# Patient Record
Sex: MALE | Race: WHITE | NOT HISPANIC OR LATINO
[De-identification: names, ages, dates, MRNs, and addresses within clinical notes are randomized per-mention and may not be internally consistent; named-entity substitution may affect disease eponyms.]

---

## 2020-05-07 ENCOUNTER — TRANSCRIPTION ENCOUNTER (OUTPATIENT)
Age: 65
End: 2020-05-07

## 2020-06-17 ENCOUNTER — TRANSCRIPTION ENCOUNTER (OUTPATIENT)
Age: 65
End: 2020-06-17

## 2020-09-07 ENCOUNTER — TRANSCRIPTION ENCOUNTER (OUTPATIENT)
Age: 65
End: 2020-09-07

## 2020-11-01 ENCOUNTER — TRANSCRIPTION ENCOUNTER (OUTPATIENT)
Age: 65
End: 2020-11-01

## 2021-08-19 ENCOUNTER — TRANSCRIPTION ENCOUNTER (OUTPATIENT)
Age: 66
End: 2021-08-19

## 2022-06-15 ENCOUNTER — NON-APPOINTMENT (OUTPATIENT)
Age: 67
End: 2022-06-15

## 2022-08-30 ENCOUNTER — APPOINTMENT (OUTPATIENT)
Dept: ORTHOPEDIC SURGERY | Facility: CLINIC | Age: 67
End: 2022-08-30

## 2022-08-30 VITALS — HEIGHT: 71 IN | WEIGHT: 198 LBS | BODY MASS INDEX: 27.72 KG/M2

## 2022-08-30 DIAGNOSIS — Z78.9 OTHER SPECIFIED HEALTH STATUS: ICD-10-CM

## 2022-08-30 DIAGNOSIS — Q74.0 OTHER CONGENITAL MALFORMATIONS OF UPPER LIMB(S), INCLUDING SHOULDER GIRDLE: ICD-10-CM

## 2022-08-30 PROCEDURE — 99203 OFFICE O/P NEW LOW 30 MIN: CPT | Mod: 25

## 2022-08-30 PROCEDURE — 20550 NJX 1 TENDON SHEATH/LIGAMENT: CPT

## 2022-08-30 NOTE — HISTORY OF PRESENT ILLNESS
[Gradual] : gradual [7] : 7 [6] : 6 [Dull/Aching] : dull/aching [Localized] : localized [Sharp] : sharp [Nothing helps with pain getting better] : Nothing helps with pain getting better [Full time] : Work status: full time [de-identified] :  67 year year old male, presents today for right hand trigger fingers \par He has had pain and locking on and off for 15 years in RIGHT hand.  Had RIGHT ring trigger finger release surgery.  Has has injections in the past.   Each of his 4 painful fingers have had one injection in the past.    Last inection was over one year ago.  NO recent trauma [] : no

## 2022-08-30 NOTE — PHYSICAL EXAM
[Right] : right hand [1st] : 1st [2nd] : 2nd [3rd] : 3rd [5th] : 5th [] : no swelling [FreeTextEntry3] : RIGHT 1,2,3,5 A1 pulley tenderness

## 2022-08-30 NOTE — REASON FOR VISIT
[FreeTextEntry2] : 08/30/2022 :IVY ITALO , a 67 year year old male, presents today for right hand trigger fingers \par

## 2022-08-30 NOTE — PROCEDURE
[Tendon Sheath] : tendon sheath [Other: ____] : [unfilled] [1st] : 1st trigger finger [___ cc    1%] : Lidocaine ~Vcc of 1%  [___ cc    10mg] : Triamcinolone (Kenalog) ~Vcc of 10 mg

## 2022-09-23 ENCOUNTER — OFFICE (OUTPATIENT)
Dept: URBAN - METROPOLITAN AREA CLINIC 32 | Facility: CLINIC | Age: 67
Setting detail: OPHTHALMOLOGY
End: 2022-09-23
Payer: MEDICARE

## 2022-09-23 DIAGNOSIS — H35.40: ICD-10-CM

## 2022-09-23 DIAGNOSIS — H35.371: ICD-10-CM

## 2022-09-23 DIAGNOSIS — H25.11: ICD-10-CM

## 2022-09-23 PROCEDURE — 92134 CPTRZ OPH DX IMG PST SGM RTA: CPT | Performed by: OPHTHALMOLOGY

## 2022-09-23 PROCEDURE — 92201 OPSCPY EXTND RTA DRAW UNI/BI: CPT | Performed by: OPHTHALMOLOGY

## 2022-09-23 PROCEDURE — 92004 COMPRE OPH EXAM NEW PT 1/>: CPT | Performed by: OPHTHALMOLOGY

## 2022-09-23 ASSESSMENT — CONFRONTATIONAL VISUAL FIELD TEST (CVF)
OD_FINDINGS: FULL
OS_FINDINGS: FULL

## 2022-09-23 ASSESSMENT — VISUAL ACUITY
OD_BCVA: 20/100
OS_BCVA: 20/60

## 2022-10-19 ENCOUNTER — OFFICE (OUTPATIENT)
Dept: URBAN - METROPOLITAN AREA CLINIC 93 | Facility: CLINIC | Age: 67
Setting detail: OPHTHALMOLOGY
End: 2022-10-19
Payer: MEDICARE

## 2022-10-19 DIAGNOSIS — H35.371: ICD-10-CM

## 2022-10-19 PROCEDURE — 99024 POSTOP FOLLOW-UP VISIT: CPT | Performed by: OPHTHALMOLOGY

## 2022-10-19 ASSESSMENT — CONFRONTATIONAL VISUAL FIELD TEST (CVF)
OS_FINDINGS: FULL
OD_FINDINGS: FULL

## 2022-10-19 ASSESSMENT — VISUAL ACUITY
OD_BCVA: 20/25
OS_BCVA: 20/60

## 2022-10-24 ENCOUNTER — OFFICE (OUTPATIENT)
Dept: URBAN - METROPOLITAN AREA CLINIC 32 | Facility: CLINIC | Age: 67
Setting detail: OPHTHALMOLOGY
End: 2022-10-24
Payer: MEDICARE

## 2022-10-24 ENCOUNTER — RX ONLY (RX ONLY)
Age: 67
End: 2022-10-24

## 2022-10-24 DIAGNOSIS — H25.13: ICD-10-CM

## 2022-10-24 DIAGNOSIS — H35.371: ICD-10-CM

## 2022-10-24 PROBLEM — H35.40 PERIPAPILLARY ATROPHY ; RIGHT EYE: Status: ACTIVE | Noted: 2022-09-23

## 2022-10-24 PROCEDURE — 99024 POSTOP FOLLOW-UP VISIT: CPT | Performed by: OPHTHALMOLOGY

## 2022-10-24 ASSESSMENT — VISUAL ACUITY
OS_BCVA: 20/60
OD_BCVA: 20/20

## 2022-10-24 ASSESSMENT — CONFRONTATIONAL VISUAL FIELD TEST (CVF)
OS_FINDINGS: FULL
OD_FINDINGS: FULL

## 2023-05-08 ENCOUNTER — OFFICE (OUTPATIENT)
Dept: URBAN - METROPOLITAN AREA CLINIC 32 | Facility: CLINIC | Age: 68
Setting detail: OPHTHALMOLOGY
End: 2023-05-08
Payer: MEDICARE

## 2023-05-08 ENCOUNTER — RX ONLY (RX ONLY)
Age: 68
End: 2023-05-08

## 2023-05-08 DIAGNOSIS — H25.13: ICD-10-CM

## 2023-05-08 DIAGNOSIS — H35.371: ICD-10-CM

## 2023-05-08 DIAGNOSIS — H43.812: ICD-10-CM

## 2023-05-08 PROCEDURE — 92012 INTRM OPH EXAM EST PATIENT: CPT | Performed by: OPHTHALMOLOGY

## 2023-05-08 PROCEDURE — 92201 OPSCPY EXTND RTA DRAW UNI/BI: CPT | Performed by: OPHTHALMOLOGY

## 2023-05-08 PROCEDURE — 92134 CPTRZ OPH DX IMG PST SGM RTA: CPT | Performed by: OPHTHALMOLOGY

## 2023-05-08 ASSESSMENT — VISUAL ACUITY
OS_BCVA: 20/40+
OD_BCVA: 20/40+

## 2023-05-08 ASSESSMENT — CONFRONTATIONAL VISUAL FIELD TEST (CVF)
OS_FINDINGS: FULL
OD_FINDINGS: FULL

## 2023-07-05 ENCOUNTER — OFFICE (OUTPATIENT)
Dept: URBAN - METROPOLITAN AREA CLINIC 70 | Facility: CLINIC | Age: 68
Setting detail: OPHTHALMOLOGY
End: 2023-07-05
Payer: MEDICARE

## 2023-07-05 DIAGNOSIS — H25.13: ICD-10-CM

## 2023-07-05 DIAGNOSIS — H25.11: ICD-10-CM

## 2023-07-05 DIAGNOSIS — H35.371: ICD-10-CM

## 2023-07-05 PROCEDURE — 92014 COMPRE OPH EXAM EST PT 1/>: CPT | Performed by: OPHTHALMOLOGY

## 2023-07-05 PROCEDURE — 92136 OPHTHALMIC BIOMETRY: CPT | Performed by: OPHTHALMOLOGY

## 2023-07-05 PROCEDURE — 92134 CPTRZ OPH DX IMG PST SGM RTA: CPT | Performed by: OPHTHALMOLOGY

## 2023-07-05 ASSESSMENT — KERATOMETRY
OD_AXISANGLE_DEGREES: 058
OS_K1K2_AVERAGE: 44.5
OD_AXISANGLE_DEGREES: 058
OS_AXISANGLE_DEGREES: 055
OD_K1POWER_DIOPTERS: 42.50
OD_K2POWER_DIOPTERS: 43.75
OD_K2POWER_DIOPTERS: 43.75
OS_K2POWER_DIOPTERS: 44.75
OD_AXISANGLE2_DEGREES: 058
OS_AXISANGLE_DEGREES: 055
OS_CYLAXISANGLE_DEGREES: 55
OD_CYLPOWER_DEGREES: 1.25
OS_K1POWER_DIOPTERS: 44.25
OD_K1K2_AVERAGE: 43.125
OS_K2POWER_DIOPTERS: 44.75
OS_AXISANGLE2_DEGREES: 055
OD_CYLAXISANGLE_DEGREES: 58
OS_K1POWER_DIOPTERS: 44.25
OD_K1POWER_DIOPTERS: 42.50
OS_CYLPOWER_DEGREES: 0.5

## 2023-07-05 ASSESSMENT — SPHEQUIV_DERIVED
OS_SPHEQUIV: -0.5
OS_SPHEQUIV: -0.5
OD_SPHEQUIV: -3.625

## 2023-07-05 ASSESSMENT — REFRACTION_CURRENTRX
OS_OVR_VA: 20/
OD_SPHERE: +1.75
OS_SPHERE: +1.75
OD_OVR_VA: 20/

## 2023-07-05 ASSESSMENT — VISUAL ACUITY
OD_BCVA: 20/30
OS_BCVA: 20/200

## 2023-07-05 ASSESSMENT — REFRACTION_AUTOREFRACTION
OD_CYLINDER: -0.25
OS_CYLINDER: -0.50
OD_AXIS: 171
OD_SPHERE: -3.50
OS_AXIS: 107
OS_SPHERE: -0.25

## 2023-07-05 ASSESSMENT — REFRACTION_MANIFEST
OS_SPHERE: -0.25
OS_AXIS: 110
OS_CYLINDER: -0.50
OD_SPHERE: UNABLE

## 2023-07-05 ASSESSMENT — CONFRONTATIONAL VISUAL FIELD TEST (CVF)
OS_FINDINGS: FULL
OD_FINDINGS: FULL

## 2023-07-05 ASSESSMENT — AXIALLENGTH_DERIVED
OS_AL: 23.4207
OD_AL: 25.251
OS_AL: 23.4207

## 2023-07-11 ENCOUNTER — APPOINTMENT (OUTPATIENT)
Dept: ORTHOPEDIC SURGERY | Facility: CLINIC | Age: 68
End: 2023-07-11
Payer: MEDICARE

## 2023-07-11 VITALS — WEIGHT: 211 LBS | HEIGHT: 71 IN | BODY MASS INDEX: 29.54 KG/M2

## 2023-07-11 DIAGNOSIS — M65.332 TRIGGER FINGER, LEFT MIDDLE FINGER: ICD-10-CM

## 2023-07-11 DIAGNOSIS — M65.341 TRIGGER FINGER, RIGHT RING FINGER: ICD-10-CM

## 2023-07-11 DIAGNOSIS — M65.321 TRIGGER FINGER, RIGHT INDEX FINGER: ICD-10-CM

## 2023-07-11 PROCEDURE — 99213 OFFICE O/P EST LOW 20 MIN: CPT | Mod: 25

## 2023-07-11 PROCEDURE — 20550 NJX 1 TENDON SHEATH/LIGAMENT: CPT | Mod: 50

## 2023-07-11 NOTE — HISTORY OF PRESENT ILLNESS
[Gradual] : gradual [7] : 7 [6] : 6 [Localized] : localized [Sharp] : sharp [Full time] : Work status: full time [de-identified] : 68 year old male presenting with RT index and ring and LT middle finger pain and locking. \par \par He reports he had a TFR for b/l ring fingers. \par \par RT index and ring injected both injected 1 times since the surgery. LT middle finger injeted once. \par  [] : Post Surgical Visit: no [FreeTextEntry1] : B/L Hands  [FreeTextEntry3] : N/A Chronic [FreeTextEntry5] : Pt is a 69 y/o LHD M c/o  many years of biklateral multi digit pain due to Hx of ATF .Prior TX of CSI  on 8/30/22 with good relief. Pt Req CSI today   [de-identified] : CSI 8/30/22 [de-identified] : [facility]

## 2023-07-11 NOTE — PHYSICAL EXAM
[Right] : right hand [2nd] : 2nd [4th] : 4th [Left] : left hand [A1-Pulley] : A1-pulley [] : positive triggering [3rd] : 3rd [FreeTextEntry3] : pretendinous to the middle finger

## 2023-07-11 NOTE — DISCUSSION/SUMMARY
[de-identified] : Discussed the nature of the diagnosis and risk and benefits of different modalities of treatment.\par Discussed conservative management vs CSI. \par Pt would like a CSI in all 3 fingers. \par Done today and tolerated well.\par All questions answered.\par

## 2023-07-11 NOTE — PROCEDURE
[Tendon Sheath] : tendon sheath [Bilateral] : bilaterally of the [Pain] : pain [Inflammation] : inflammation [Alcohol] : alcohol [Ethyl Chloride sprayed topically] : ethyl chloride sprayed topically [Sterile technique used] : sterile technique used [___ cc    1%] : Lidocaine ~Vcc of 1%  [___ cc    10mg] : Triamcinolone (Kenalog) ~Vcc of 10 mg  [] : Patient tolerated procedure well [Risks, benefits, alternatives discussed / Verbal consent obtained] : the risks benefits, and alternatives have been discussed, and verbal consent was obtained [de-identified] : RT Index and ring and LT middle

## 2023-07-17 ENCOUNTER — OFFICE (OUTPATIENT)
Dept: URBAN - METROPOLITAN AREA CLINIC 104 | Facility: CLINIC | Age: 68
Setting detail: OPHTHALMOLOGY
End: 2023-07-17
Payer: MEDICARE

## 2023-07-17 DIAGNOSIS — H25.12: ICD-10-CM

## 2023-07-17 DIAGNOSIS — H25.11: ICD-10-CM

## 2023-07-17 DIAGNOSIS — H25.13: ICD-10-CM

## 2023-07-17 PROCEDURE — 401 NO CHARGE VISIT: Performed by: OPHTHALMOLOGY

## 2023-07-17 ASSESSMENT — SPHEQUIV_DERIVED
OS_SPHEQUIV: -0.5
OD_SPHEQUIV: -3.625
OS_SPHEQUIV: -0.5

## 2023-07-17 ASSESSMENT — KERATOMETRY
OS_AXISANGLE_DEGREES: 055
OS_K1POWER_DIOPTERS: 44.25
OD_AXISANGLE_DEGREES: 058
OD_K1POWER_DIOPTERS: 42.50
OS_K2POWER_DIOPTERS: 44.75
OD_K2POWER_DIOPTERS: 43.75

## 2023-07-17 ASSESSMENT — REFRACTION_MANIFEST
OS_SPHERE: -0.25
OD_SPHERE: UNABLE
OS_AXIS: 110
OS_CYLINDER: -0.50

## 2023-07-17 ASSESSMENT — REFRACTION_AUTOREFRACTION
OS_AXIS: 107
OD_SPHERE: -3.50
OD_AXIS: 171
OS_CYLINDER: -0.50
OS_SPHERE: -0.25
OD_CYLINDER: -0.25

## 2023-07-17 ASSESSMENT — AXIALLENGTH_DERIVED
OS_AL: 23.4207
OS_AL: 23.4207
OD_AL: 25.251

## 2023-07-17 ASSESSMENT — REFRACTION_CURRENTRX
OD_OVR_VA: 20/
OS_OVR_VA: 20/
OS_SPHERE: +1.75
OD_SPHERE: +1.75

## 2023-07-17 ASSESSMENT — VISUAL ACUITY
OD_BCVA: 20/30
OS_BCVA: 20/200

## 2023-08-08 ENCOUNTER — APPOINTMENT (OUTPATIENT)
Dept: ORTHOPEDIC SURGERY | Facility: CLINIC | Age: 68
End: 2023-08-08
Payer: MEDICARE

## 2023-08-08 VITALS — HEIGHT: 71 IN | BODY MASS INDEX: 29.54 KG/M2 | WEIGHT: 211 LBS

## 2023-08-08 PROCEDURE — 20550 NJX 1 TENDON SHEATH/LIGAMENT: CPT | Mod: RT

## 2023-08-08 PROCEDURE — 99213 OFFICE O/P EST LOW 20 MIN: CPT | Mod: 25

## 2023-08-08 NOTE — PROCEDURE
[Large Joint Injection] : Large joint injection [Right] : of the right [5th] : 5th trigger finger [Pain] : pain [Inflammation] : inflammation [Alcohol] : alcohol [Betadine] : betadine [Ethyl Chloride sprayed topically] : ethyl chloride sprayed topically [Sterile technique used] : sterile technique used [___ cc    1%] : Lidocaine ~Vcc of 1%  [___ cc    10mg] : Triamcinolone (Kenalog) ~Vcc of 10 mg  [] : Patient tolerated procedure well [Call if redness, pain or fever occur] : call if redness, pain or fever occur [Apply ice for 15min out of every hour for the next 12-24 hours as tolerated] : apply ice for 15 minutes out of every hour for the next 12-24 hours as tolerated [Patient was advised to rest the joint(s) for ____ days] : patient was advised to rest the joint(s) for [unfilled] days [Patient had decreased mobility in the joint] : patient had decreased mobility in the joint [Risks, benefits, alternatives discussed / Verbal consent obtained] : the risks benefits, and alternatives have been discussed, and verbal consent was obtained

## 2023-08-08 NOTE — HISTORY OF PRESENT ILLNESS
[Gradual] : gradual [7] : 7 [6] : 6 [Localized] : localized [Sharp] : sharp [Full time] : Work status: full time [de-identified] : The injections were helpful for the right ring and middle finger, and for the left middle finger. c/o of right little finger pain  [] : Post Surgical Visit: no [FreeTextEntry1] : B/L Hands  [FreeTextEntry3] : N/A Chronic [FreeTextEntry5] : Pt is a 69 y/o LHD M c/o  many years of biklateral multi digit pain due to Hx of ATF .Prior TX of CSI  on 8/30/22 with good relief. Pt Req CSI today   [de-identified] : CSI 8/30/22 [de-identified] : [facility]

## 2023-08-08 NOTE — ASSESSMENT
[FreeTextEntry1] : Discussed the nature of the diagnosis and risk and benefits of different modalities of treatment. Saw good relief with injection for the other trigger fingers.  Offered and elected for right little tendon sheath injection.  The risks, benefits, alternatives, and contents of the injection have been discussed.  Risks include but are not limited to allergic reaction, flare reaction, bruising, injection site pain, numbness, increased blood sugar, permanent white skin discoloration at the injection site and infection.  The patient understands the risks and agrees to having the injection.  All questions have been answered. Patient agrees to injection.

## 2023-08-16 ENCOUNTER — ASC (OUTPATIENT)
Dept: URBAN - METROPOLITAN AREA SURGERY 8 | Facility: SURGERY | Age: 68
Setting detail: OPHTHALMOLOGY
End: 2023-08-16
Payer: MEDICARE

## 2023-08-16 DIAGNOSIS — H25.11: ICD-10-CM

## 2023-08-16 DIAGNOSIS — H52.211: ICD-10-CM

## 2023-08-16 PROCEDURE — A9270 NON-COVERED ITEM OR SERVICE: HCPCS | Performed by: OPHTHALMOLOGY

## 2023-08-16 PROCEDURE — 66984 XCAPSL CTRC RMVL W/O ECP: CPT | Performed by: OPHTHALMOLOGY

## 2023-08-16 PROCEDURE — FEMTO FEMTOSECOND LASER: Performed by: OPHTHALMOLOGY

## 2023-08-17 ENCOUNTER — RX ONLY (RX ONLY)
Age: 68
End: 2023-08-17

## 2023-08-17 ENCOUNTER — OFFICE (OUTPATIENT)
Dept: URBAN - METROPOLITAN AREA CLINIC 70 | Facility: CLINIC | Age: 68
Setting detail: OPHTHALMOLOGY
End: 2023-08-17
Payer: MEDICARE

## 2023-08-17 DIAGNOSIS — Z96.1: ICD-10-CM

## 2023-08-17 PROCEDURE — 99024 POSTOP FOLLOW-UP VISIT: CPT | Performed by: OPHTHALMOLOGY

## 2023-08-17 ASSESSMENT — REFRACTION_CURRENTRX
OD_SPHERE: +1.75
OS_SPHERE: +1.75
OD_OVR_VA: 20/
OS_OVR_VA: 20/

## 2023-08-17 ASSESSMENT — AXIALLENGTH_DERIVED
OD_AL: 23.7333
OS_AL: 23.2803
OS_AL: 23.3755

## 2023-08-17 ASSESSMENT — REFRACTION_MANIFEST
OS_SPHERE: -0.25
OD_SPHERE: UNABLE
OS_CYLINDER: -0.50
OS_AXIS: 110

## 2023-08-17 ASSESSMENT — KERATOMETRY
OS_K1POWER_DIOPTERS: 44.25
OD_AXISANGLE_DEGREES: 049
OD_K2POWER_DIOPTERS: 44.00
OD_K1POWER_DIOPTERS: 42.50
OS_AXISANGLE_DEGREES: 033
OS_K2POWER_DIOPTERS: 45.00

## 2023-08-17 ASSESSMENT — VISUAL ACUITY
OD_BCVA: 20/40-2
OS_BCVA: 20/40

## 2023-08-17 ASSESSMENT — CONFRONTATIONAL VISUAL FIELD TEST (CVF)
OS_FINDINGS: FULL
OD_FINDINGS: FULL

## 2023-08-17 ASSESSMENT — REFRACTION_AUTOREFRACTION
OS_AXIS: 102
OD_CYLINDER: -1.75
OD_AXIS: 132
OS_CYLINDER: -1.00
OD_SPHERE: +0.75
OS_SPHERE: +0.25

## 2023-08-17 ASSESSMENT — SPHEQUIV_DERIVED
OS_SPHEQUIV: -0.25
OS_SPHEQUIV: -0.5
OD_SPHEQUIV: -0.125

## 2023-08-23 ENCOUNTER — OFFICE (OUTPATIENT)
Dept: URBAN - METROPOLITAN AREA CLINIC 70 | Facility: CLINIC | Age: 68
Setting detail: OPHTHALMOLOGY
End: 2023-08-23
Payer: MEDICARE

## 2023-08-23 DIAGNOSIS — Z96.1: ICD-10-CM

## 2023-08-23 PROCEDURE — 99024 POSTOP FOLLOW-UP VISIT: CPT | Performed by: OPHTHALMOLOGY

## 2023-08-23 ASSESSMENT — SPHEQUIV_DERIVED
OD_SPHEQUIV: 0.625
OS_SPHEQUIV: -0.5
OS_SPHEQUIV: -0.125

## 2023-08-23 ASSESSMENT — CONFRONTATIONAL VISUAL FIELD TEST (CVF)
OD_FINDINGS: FULL
OS_FINDINGS: FULL

## 2023-08-23 ASSESSMENT — REFRACTION_CURRENTRX
OS_OVR_VA: 20/
OD_OVR_VA: 20/
OD_SPHERE: +1.75
OS_SPHERE: +1.75

## 2023-08-23 ASSESSMENT — KERATOMETRY
OS_AXISANGLE_DEGREES: 031
OS_K2POWER_DIOPTERS: 44.50
OS_K1POWER_DIOPTERS: 44.00
OD_K1POWER_DIOPTERS: 42.00
OD_AXISANGLE_DEGREES: 055
OD_K2POWER_DIOPTERS: 43.50

## 2023-08-23 ASSESSMENT — REFRACTION_AUTOREFRACTION
OD_SPHERE: +1.00
OD_AXIS: 096
OS_SPHERE: +0.50
OD_CYLINDER: -0.75
OS_AXIS: 105
OS_CYLINDER: -1.25

## 2023-08-23 ASSESSMENT — REFRACTION_MANIFEST
OS_AXIS: 110
OS_CYLINDER: -0.50
OD_SPHERE: UNABLE
OS_SPHERE: -0.25

## 2023-08-23 ASSESSMENT — AXIALLENGTH_DERIVED
OS_AL: 23.5115
OD_AL: 23.6239
OS_AL: 23.3673

## 2023-08-23 ASSESSMENT — VISUAL ACUITY
OS_BCVA: 20/25
OD_BCVA: 20/40-2

## 2023-09-07 ENCOUNTER — OFFICE (OUTPATIENT)
Dept: URBAN - METROPOLITAN AREA CLINIC 32 | Facility: CLINIC | Age: 68
Setting detail: OPHTHALMOLOGY
End: 2023-09-07
Payer: MEDICARE

## 2023-09-07 DIAGNOSIS — H43.812: ICD-10-CM

## 2023-09-07 DIAGNOSIS — H35.371: ICD-10-CM

## 2023-09-07 DIAGNOSIS — H43.391: ICD-10-CM

## 2023-09-07 PROBLEM — Z96.1 PSEUDOPHAKIA ; RIGHT EYE: Status: ACTIVE | Noted: 2023-08-17

## 2023-09-07 PROCEDURE — 92012 INTRM OPH EXAM EST PATIENT: CPT | Performed by: OPHTHALMOLOGY

## 2023-09-07 PROCEDURE — 92134 CPTRZ OPH DX IMG PST SGM RTA: CPT | Performed by: OPHTHALMOLOGY

## 2023-09-07 PROCEDURE — 99024 POSTOP FOLLOW-UP VISIT: CPT | Performed by: OPHTHALMOLOGY

## 2023-09-07 ASSESSMENT — KERATOMETRY
OS_AXISANGLE_DEGREES: 031
OD_K1POWER_DIOPTERS: 42.00
OD_AXISANGLE_DEGREES: 055
OS_K2POWER_DIOPTERS: 44.50
OS_K1POWER_DIOPTERS: 44.00
OD_K2POWER_DIOPTERS: 43.50

## 2023-09-07 ASSESSMENT — AXIALLENGTH_DERIVED
OD_AL: 23.6239
OS_AL: 23.3673

## 2023-09-07 ASSESSMENT — SPHEQUIV_DERIVED
OS_SPHEQUIV: -0.125
OD_SPHEQUIV: 0.625

## 2023-09-07 ASSESSMENT — REFRACTION_AUTOREFRACTION
OD_SPHERE: +1.00
OS_SPHERE: +0.50
OD_CYLINDER: -0.75
OS_AXIS: 105
OD_AXIS: 096
OS_CYLINDER: -1.25

## 2023-09-07 ASSESSMENT — CONFRONTATIONAL VISUAL FIELD TEST (CVF)
OS_FINDINGS: FULL
OD_FINDINGS: FULL

## 2023-09-07 ASSESSMENT — VISUAL ACUITY
OS_BCVA: 20/40-1
OD_BCVA: 20/30-1

## 2023-09-19 ENCOUNTER — OFFICE (OUTPATIENT)
Dept: URBAN - METROPOLITAN AREA CLINIC 70 | Facility: CLINIC | Age: 68
Setting detail: OPHTHALMOLOGY
End: 2023-09-19
Payer: MEDICARE

## 2023-09-19 DIAGNOSIS — Z96.1: ICD-10-CM

## 2023-09-19 PROBLEM — H43.391 VITREOUS FLOATERS; RIGHT EYE: Status: ACTIVE | Noted: 2023-09-07

## 2023-09-19 PROCEDURE — 99024 POSTOP FOLLOW-UP VISIT: CPT | Performed by: OPHTHALMOLOGY

## 2023-09-19 ASSESSMENT — REFRACTION_AUTOREFRACTION
OD_AXIS: 088
OS_SPHERE: +0.25
OS_CYLINDER: -1.00
OD_SPHERE: +0.25
OD_CYLINDER: -0.25
OS_AXIS: 088

## 2023-09-19 ASSESSMENT — REFRACTION_MANIFEST
OD_ADD: +2.25
OS_ADD: +2.25
OD_VA1: 20/30
OS_CYLINDER: -1.00
OU_VA: 20/20
OS_AXIS: 090
OD_SPHERE: +0.25
OS_VA1: 20/30
OD_CYLINDER: -0.25
OD_AXIS: 090
OS_SPHERE: PL

## 2023-09-19 ASSESSMENT — KERATOMETRY
OS_AXISANGLE_DEGREES: 002
OS_K1POWER_DIOPTERS: 43.75
OD_K1POWER_DIOPTERS: 42.25
OD_AXISANGLE_DEGREES: 041
OS_K2POWER_DIOPTERS: 44.50
OD_K2POWER_DIOPTERS: 43.25

## 2023-09-19 ASSESSMENT — VISUAL ACUITY
OD_BCVA: 20/30-1
OS_BCVA: 20/40

## 2023-09-19 ASSESSMENT — SPHEQUIV_DERIVED
OD_SPHEQUIV: 0.125
OS_SPHEQUIV: -0.25
OD_SPHEQUIV: 0.125

## 2023-09-19 ASSESSMENT — AXIALLENGTH_DERIVED
OD_AL: 23.8209
OD_AL: 23.8209
OS_AL: 23.4605

## 2023-09-19 ASSESSMENT — CONFRONTATIONAL VISUAL FIELD TEST (CVF)
OD_FINDINGS: FULL
OS_FINDINGS: FULL

## 2023-12-15 ENCOUNTER — APPOINTMENT (RX ONLY)
Dept: URBAN - METROPOLITAN AREA CLINIC 123 | Facility: CLINIC | Age: 68
Setting detail: DERMATOLOGY
End: 2023-12-15

## 2023-12-15 DIAGNOSIS — L28.0 LICHEN SIMPLEX CHRONICUS: ICD-10-CM | Status: INADEQUATELY CONTROLLED

## 2023-12-15 PROCEDURE — 11900 INJECT SKIN LESIONS </W 7: CPT

## 2023-12-15 PROCEDURE — ? INTRALESIONAL KENALOG

## 2023-12-15 PROCEDURE — ? COUNSELING

## 2023-12-15 PROCEDURE — ? PRESCRIPTION

## 2023-12-15 RX ORDER — BETAMETHASONE DIPROPIONATE 0.5 MG/G
OINTMENT TOPICAL
Qty: 45 | Refills: 1 | Status: ERX | COMMUNITY
Start: 2023-12-15

## 2023-12-15 RX ADMIN — BETAMETHASONE DIPROPIONATE: 0.5 OINTMENT TOPICAL at 00:00

## 2023-12-15 ASSESSMENT — LOCATION ZONE DERM: LOCATION ZONE: ARM

## 2023-12-15 ASSESSMENT — LOCATION DETAILED DESCRIPTION DERM
LOCATION DETAILED: RIGHT PROXIMAL DORSAL FOREARM
LOCATION DETAILED: LEFT PROXIMAL DORSAL FOREARM

## 2023-12-15 ASSESSMENT — LOCATION SIMPLE DESCRIPTION DERM
LOCATION SIMPLE: LEFT FOREARM
LOCATION SIMPLE: RIGHT FOREARM

## 2023-12-15 NOTE — PROCEDURE: INTRALESIONAL KENALOG
Consent: The risks of atrophy were reviewed with the patient.
Kenalog Preparation: Kenalog
Detail Level: Detailed
How Many Mls Were Removed From The 40 Mg/Ml (1ml) Vial When Preparing The Injectable Solution?: 0
Administered By (Optional): puneet
Kenalog Type Of Vial: Multiple Dose
Bill For Wasted Drug (Kenalog)?: no
Total Volume (Ccs): 1
Validate Note Data When Using Inventory: Yes
Medical Necessity Clause: This procedure was medically necessary because the lesions that were treated were:
Concentration Of Kenalog Solution Injected (Mg/Ml): 5.0

## 2024-01-18 ENCOUNTER — APPOINTMENT (RX ONLY)
Dept: URBAN - METROPOLITAN AREA CLINIC 123 | Facility: CLINIC | Age: 69
Setting detail: DERMATOLOGY
End: 2024-01-18

## 2024-01-18 DIAGNOSIS — L28.0 LICHEN SIMPLEX CHRONICUS: ICD-10-CM | Status: IMPROVED

## 2024-01-18 PROCEDURE — 99213 OFFICE O/P EST LOW 20 MIN: CPT

## 2024-01-18 PROCEDURE — ? COUNSELING

## 2024-01-18 PROCEDURE — ? PRESCRIPTION MEDICATION MANAGEMENT

## 2024-01-18 PROCEDURE — ? PRESCRIPTION

## 2024-01-18 RX ORDER — UREA 40 G/100G
CREAM TOPICAL
Qty: 85 | Refills: 0 | Status: ERX | COMMUNITY
Start: 2024-01-18

## 2024-01-18 RX ADMIN — UREA: 40 CREAM TOPICAL at 00:00

## 2024-01-18 ASSESSMENT — LOCATION DETAILED DESCRIPTION DERM
LOCATION DETAILED: RIGHT ELBOW
LOCATION DETAILED: LEFT LATERAL ELBOW
LOCATION DETAILED: LEFT ELBOW

## 2024-01-18 ASSESSMENT — LOCATION SIMPLE DESCRIPTION DERM
LOCATION SIMPLE: RIGHT ELBOW
LOCATION SIMPLE: LEFT ELBOW

## 2024-01-18 ASSESSMENT — LOCATION ZONE DERM: LOCATION ZONE: ARM

## 2024-01-18 NOTE — PROCEDURE: PRESCRIPTION MEDICATION MANAGEMENT
Initiate Treatment: Urea 40 % cream apply BID on elbows
Detail Level: Zone
Render In Strict Bullet Format?: No
Modify Regimen: Betamethasone dipropionate cream 0.05% once a day elbows

## 2024-04-23 ENCOUNTER — APPOINTMENT (RX ONLY)
Dept: URBAN - METROPOLITAN AREA CLINIC 123 | Facility: CLINIC | Age: 69
Setting detail: DERMATOLOGY
End: 2024-04-23

## 2024-04-23 DIAGNOSIS — L28.0 LICHEN SIMPLEX CHRONICUS: ICD-10-CM | Status: IMPROVED

## 2024-04-23 PROCEDURE — ? COUNSELING

## 2024-04-23 PROCEDURE — ? PRESCRIPTION MEDICATION MANAGEMENT

## 2024-04-23 PROCEDURE — 99214 OFFICE O/P EST MOD 30 MIN: CPT

## 2024-04-23 PROCEDURE — ? PRESCRIPTION

## 2024-04-23 RX ORDER — UREA 40 G/100G
CREAM TOPICAL
Qty: 85 | Refills: 4 | Status: ERX

## 2024-04-23 ASSESSMENT — LOCATION SIMPLE DESCRIPTION DERM
LOCATION SIMPLE: RIGHT ELBOW
LOCATION SIMPLE: LEFT ELBOW

## 2024-04-23 ASSESSMENT — LOCATION ZONE DERM: LOCATION ZONE: ARM

## 2024-04-23 ASSESSMENT — LOCATION DETAILED DESCRIPTION DERM
LOCATION DETAILED: LEFT ELBOW
LOCATION DETAILED: RIGHT ELBOW
LOCATION DETAILED: LEFT LATERAL ELBOW

## 2024-04-23 NOTE — PROCEDURE: PRESCRIPTION MEDICATION MANAGEMENT
Plan: One patient returns from [place] patient to [facility] to fup on LSC
Discontinue Regimen: Betamethasone dipropionate cream 0.05% once a day elbows
Detail Level: Zone
Render In Strict Bullet Format?: No
Continue Regimen: Urea 40 % cream apply BID on elbows

## 2024-05-28 ENCOUNTER — APPOINTMENT (OUTPATIENT)
Dept: ORTHOPEDIC SURGERY | Facility: CLINIC | Age: 69
End: 2024-05-28
Payer: MEDICARE

## 2024-05-28 VITALS — HEIGHT: 71 IN | BODY MASS INDEX: 29.54 KG/M2 | WEIGHT: 211 LBS

## 2024-05-28 DIAGNOSIS — M65.351 TRIGGER FINGER, RIGHT LITTLE FINGER: ICD-10-CM

## 2024-05-28 DIAGNOSIS — M65.331 TRIGGER FINGER, RIGHT MIDDLE FINGER: ICD-10-CM

## 2024-05-28 PROCEDURE — 20550 NJX 1 TENDON SHEATH/LIGAMENT: CPT | Mod: RT

## 2024-05-28 PROCEDURE — 99213 OFFICE O/P EST LOW 20 MIN: CPT | Mod: 25

## 2024-05-28 NOTE — PROCEDURE
[FreeTextEntry3] : Tendon sheath was performed of the right 3rd trigger finger. The indication for this procedure was pain and inflammation. The site was prepped with alcohol, ethyl chloride sprayed topically, and sterile technique used. An injection of Lidocaine 0.5cc of 1%, Triamcinolone (Kenalog) 0.5cc of 10 mg was used. Patient tolerated procedure well.   The risks, benefits, and alternatives have been discussed, and verbal consent was obtained.   The risks, benefits, alternatives, and contents of the injection have been discussed.  Risks include but are not limited to allergic reaction, flare reaction, bruising, injection site pain, numbness, increased blood sugar, permanent white skin discoloration at the injection site and infection.  The patient understands the risks and agrees to having the injection.  All questions have been answered. Patient agrees to injection.  Tendon sheath was performed of the right 5th trigger finger. The indication for this procedure was pain and inflammation. The site was prepped with alcohol, ethyl chloride sprayed topically, and sterile technique used. An injection of Lidocaine 0.5cc of 1%, Triamcinolone (Kenalog) 0.5cc of 10 mg was used. Patient tolerated procedure well.   The risks benefits, and alternatives have been discussed, and verbal consent was obtained.   The risks, benefits, alternatives, and contents of the injection have been discussed.  Risks include but are not limited to allergic reaction, flare reaction, bruising, injection site pain, numbness, increased blood sugar, permanent white skin discoloration at the injection site and infection.  The patient understands the risks and agrees to having the injection.  All questions have been answered. Patient agrees to injection.

## 2024-05-28 NOTE — HISTORY OF PRESENT ILLNESS
[Gradual] : gradual [7] : 7 [6] : 6 [Localized] : localized [Sharp] : sharp [Full time] : Work status: full time [de-identified] : 69 year old male followed for RT middle, ring and little trigger finger. Also followed for LT middle trigger finger. RT little T Finjected in August 2023.   RT little trigger CSI 8/2023, 8/2022  RT index 8/2022 RT middle 8/2022 RT thumb 8/2022 [] : Post Surgical Visit: no [FreeTextEntry1] : B/L Hands  [FreeTextEntry3] : N/A Chronic [FreeTextEntry5] : Pt is a 69 y/o LHD M c/o  many years of biklateral multi digit pain due to Hx of ATF .Prior TX of CSI  on 8/30/22 with good relief. Pt Req CSI today   [de-identified] : CSI 8/30/22 [de-identified] : [facility]

## 2024-05-28 NOTE — DISCUSSION/SUMMARY
[de-identified] : Discussed the nature of the diagnosis and risk and benefits of different modalities of treatment.

## 2024-06-26 ENCOUNTER — OFFICE (OUTPATIENT)
Dept: URBAN - METROPOLITAN AREA CLINIC 70 | Facility: CLINIC | Age: 69
Setting detail: OPHTHALMOLOGY
End: 2024-06-26
Payer: MEDICARE

## 2024-06-26 DIAGNOSIS — H35.371: ICD-10-CM

## 2024-06-26 DIAGNOSIS — Z96.1: ICD-10-CM

## 2024-06-26 DIAGNOSIS — H26.491: ICD-10-CM

## 2024-06-26 DIAGNOSIS — H25.12: ICD-10-CM

## 2024-06-26 DIAGNOSIS — H35.40: ICD-10-CM

## 2024-06-26 DIAGNOSIS — H43.812: ICD-10-CM

## 2024-06-26 PROCEDURE — 92014 COMPRE OPH EXAM EST PT 1/>: CPT | Performed by: OPHTHALMOLOGY

## 2024-06-26 ASSESSMENT — CONFRONTATIONAL VISUAL FIELD TEST (CVF)
OS_FINDINGS: FULL
OD_FINDINGS: FULL

## 2024-08-27 ENCOUNTER — APPOINTMENT (OUTPATIENT)
Dept: ORTHOPEDIC SURGERY | Facility: CLINIC | Age: 69
End: 2024-08-27
Payer: MEDICARE

## 2024-08-27 DIAGNOSIS — M65.331 TRIGGER FINGER, RIGHT MIDDLE FINGER: ICD-10-CM

## 2024-08-27 DIAGNOSIS — M65.351 TRIGGER FINGER, RIGHT LITTLE FINGER: ICD-10-CM

## 2024-08-27 PROCEDURE — 20550 NJX 1 TENDON SHEATH/LIGAMENT: CPT | Mod: RT

## 2024-08-27 PROCEDURE — 99213 OFFICE O/P EST LOW 20 MIN: CPT | Mod: 25

## 2024-08-27 NOTE — PROCEDURE
[FreeTextEntry3] : Tendon sheath was performed of the right 5th trigger finger. The indication for this procedure was pain and inflammation. The site was prepped with alcohol, ethyl chloride sprayed topically, and sterile technique used. An injection of Lidocaine 0.5cc of 1%, Triamcinolone (Kenalog) 0.5cc of 10 mg was used. Patient tolerated procedure well.   The risks benefits, and alternatives have been discussed, and verbal consent was obtained.  Tendon sheath was performed of the right 3rd trigger finger. The indication for this procedure was pain and inflammation. The site was prepped with alcohol, ethyl chloride sprayed topically, and sterile technique used. An injection of Lidocaine 0.5cc of 1%, Triamcinolone (Kenalog) 0.5cc of 10 mg was used. Patient tolerated procedure well.   The risks, benefits, and alternatives have been discussed, and verbal consent was obtained.

## 2024-08-27 NOTE — DISCUSSION/SUMMARY
[de-identified] : Discussed the nature of the diagnosis and risk and benefits of different modalities of treatment. RT little & rt middle trigger fingers Discussed conservative management as symptoms demand vs CSI. Pt would like a CSI in both fingers Done today and tolerated well. All questions answered.

## 2024-08-27 NOTE — HISTORY OF PRESENT ILLNESS
[Gradual] : gradual [7] : 7 [6] : 6 [Localized] : localized [Sharp] : sharp [Full time] : Work status: full time [de-identified] : 69 year old male followed for RT middle, ring and little trigger finger. Also followed for LT middle trigger finger. RT little T Finjected in August 2023. Today he is c/o RT little and middle trigger fingers.  RT little trigger CSI 8/2023, 8/2022 , 5/24, 8/27/24 RT index 8/2022 RT middle 8/2022, 5/24, 8/27/24 RT thumb 8/2022 [] : Post Surgical Visit: no [FreeTextEntry1] : B/L Hands  [FreeTextEntry3] : N/A Chronic [FreeTextEntry5] : right hand / states pain comes and goes, states a lot of stiffness in hand / has a weak feeling in hand.  tenderness in fingers. tender in palm [de-identified] : CSI 8/30/22 [de-identified] : [facility]

## 2025-05-20 ENCOUNTER — OFFICE (OUTPATIENT)
Dept: URBAN - METROPOLITAN AREA CLINIC 70 | Facility: CLINIC | Age: 70
Setting detail: OPHTHALMOLOGY
End: 2025-05-20
Payer: MEDICARE

## 2025-05-20 DIAGNOSIS — H35.40: ICD-10-CM

## 2025-05-20 DIAGNOSIS — Z96.1: ICD-10-CM

## 2025-05-20 DIAGNOSIS — H35.371: ICD-10-CM

## 2025-05-20 DIAGNOSIS — H25.12: ICD-10-CM

## 2025-05-20 DIAGNOSIS — H26.491: ICD-10-CM

## 2025-05-20 DIAGNOSIS — H43.812: ICD-10-CM

## 2025-05-20 PROCEDURE — 92134 CPTRZ OPH DX IMG PST SGM RTA: CPT | Performed by: OPHTHALMOLOGY

## 2025-05-20 PROCEDURE — 92014 COMPRE OPH EXAM EST PT 1/>: CPT | Performed by: OPHTHALMOLOGY

## 2025-05-20 ASSESSMENT — REFRACTION_AUTOREFRACTION
OS_SPHERE: -0.50
OD_AXIS: 114
OD_SPHERE: +0.50
OS_CYLINDER: -0.50
OD_CYLINDER: -0.75
OS_AXIS: 105

## 2025-05-20 ASSESSMENT — VISUAL ACUITY
OS_BCVA: 20/25-
OD_BCVA: 20/20-2

## 2025-05-20 ASSESSMENT — CONFRONTATIONAL VISUAL FIELD TEST (CVF)
OS_FINDINGS: FULL
OD_FINDINGS: FULL

## 2025-05-20 ASSESSMENT — KERATOMETRY
OD_K1POWER_DIOPTERS: 42.50
OD_K2POWER_DIOPTERS: 43.50
OS_AXISANGLE_DEGREES: 020
OS_K2POWER_DIOPTERS: 44.75
OD_AXISANGLE_DEGREES: 047
OS_K1POWER_DIOPTERS: 44.25

## 2025-08-05 ENCOUNTER — APPOINTMENT (OUTPATIENT)
Dept: ORTHOPEDIC SURGERY | Facility: CLINIC | Age: 70
End: 2025-08-05
Payer: MEDICARE

## 2025-08-05 VITALS — BODY MASS INDEX: 29.54 KG/M2 | WEIGHT: 211 LBS | HEIGHT: 71 IN

## 2025-08-05 DIAGNOSIS — M65.332 TRIGGER FINGER, LEFT MIDDLE FINGER: ICD-10-CM

## 2025-08-05 DIAGNOSIS — M67.449 GANGLION, UNSPECIFIED HAND: ICD-10-CM

## 2025-08-05 DIAGNOSIS — M65.331 TRIGGER FINGER, RIGHT MIDDLE FINGER: ICD-10-CM

## 2025-08-05 DIAGNOSIS — M67.441 GANGLION, RIGHT HAND: ICD-10-CM

## 2025-08-05 PROCEDURE — 20550 NJX 1 TENDON SHEATH/LIGAMENT: CPT

## 2025-08-05 PROCEDURE — 99213 OFFICE O/P EST LOW 20 MIN: CPT | Mod: 25

## 2025-09-16 ENCOUNTER — APPOINTMENT (OUTPATIENT)
Facility: CLINIC | Age: 70
End: 2025-09-16

## 2025-09-16 ENCOUNTER — APPOINTMENT (OUTPATIENT)
Dept: ORTHOPEDIC SURGERY | Facility: CLINIC | Age: 70
End: 2025-09-16